# Patient Record
Sex: MALE
[De-identification: names, ages, dates, MRNs, and addresses within clinical notes are randomized per-mention and may not be internally consistent; named-entity substitution may affect disease eponyms.]

---

## 2022-12-01 PROBLEM — Z00.00 ENCOUNTER FOR PREVENTIVE HEALTH EXAMINATION: Status: ACTIVE | Noted: 2022-12-01

## 2022-12-02 ENCOUNTER — APPOINTMENT (OUTPATIENT)
Dept: UROLOGY | Facility: CLINIC | Age: 57
End: 2022-12-02
Payer: COMMERCIAL

## 2022-12-02 VITALS
RESPIRATION RATE: 15 BRPM | OXYGEN SATURATION: 97 % | SYSTOLIC BLOOD PRESSURE: 112 MMHG | TEMPERATURE: 98.3 F | WEIGHT: 127.87 LBS | HEART RATE: 61 BPM | DIASTOLIC BLOOD PRESSURE: 74 MMHG | HEIGHT: 61.42 IN | BODY MASS INDEX: 23.83 KG/M2

## 2022-12-02 DIAGNOSIS — R39.9 UNSPECIFIED SYMPTOMS AND SIGNS INVOLVING THE GENITOURINARY SYSTEM: ICD-10-CM

## 2022-12-02 DIAGNOSIS — Z11.3 ENCOUNTER FOR SCREENING FOR INFECTIONS WITH A PREDOMINANTLY SEXUAL MODE OF TRANSMISSION: ICD-10-CM

## 2022-12-02 PROCEDURE — 51798 US URINE CAPACITY MEASURE: CPT

## 2022-12-02 PROCEDURE — 99204 OFFICE O/P NEW MOD 45 MIN: CPT

## 2022-12-02 NOTE — HISTORY OF PRESENT ILLNESS
[FreeTextEntry1] : Language: Sinhala \par Date of First visit: 12/2/2022\par Accompanied by: friend\par Contact info: \par Referring Provider/PCP: Dr. Emma Tran\par Fax: [phone]\par \par CC/ Problem List: \par \par Urinary symptoms \par STI screening \par \par ===============================================================================\par FIRST VISIT / Summary:\par Very pleasant 58 yo M here for burning with urination, after unprotected sex about 1 month ago. He initially had some lesions on the skin. He describes itchy, with some linear cuts/tears. No pus, no discharge, no vesicles. He saw his primary doctor who gave amoxicillin which he took for 1 week. He said this did not help his symptoms. He also started a cream which has helped some (hydrocortisone). He is taking meloxicam without much help. He is having post-void dribble, still with frequency. IPSS 27 bother of 1 (he marked intermittency and weak stream but in conversation stated has strong stream).\par \par He went to [facility] emergency but said they did not take blood or urine for testing, just gave the hydrocortisone.\par \par -------------------------------------------------------------------------------------------\par INTERVAL VISITS:\par \par ===============================================================================\par \par PMH: none\par Meds: tamsulosin, meloxicam, hydrocortisone ointment\par All: nkda\par FHx: no  malignancy\par SocHx: current smoker, etoh approx , sexually active 1 female partner unprotected\par \par PSH: none\par \par \par ROS: Review of Systems is as per HPI unless otherwise denoted below\par \par \par ===============================================================================\par DATA: \par \par LABS (SELECTED):---------------------------------------------------------------------------------------------------\par 12/2/2022: UA/Cx and STI screening\par \par \par RADS:-------------------------------------------------------------------------------------------------------------------\par \par \par PATHOLOGY/CYTOLOGY:-------------------------------------------------------------------------------------------\par \par \par VOIDING STUDIES: ----------------------------------------------------------------------------------------------------\par 12/2/2022 PVR 35\par \par STONE STUDIES: (Analysis/LLSA)----------------------------------------------------------------------------------\par \par \par \par PROCEDURES: -----------------------------------------------------------------------------------------------\par \par \par \par \par ===============================================================================\par \par PHYSICAL EXAM:\par \par GEN: AAOx3, NAD; \par \par BARRIERS to CARE: none\par \par PSYCH: Appropriate Behavior, Affect Congruent\par \par HEENT: AT/NC Trachea midline. EOMI.\par \par Lungs: No labored breathing.\par \par NEURO: + Movement, all 4 extremities grossly intact without deficits.\par \par SKIN: Warm dry. No visible rashes or ulcers\par \par GAIT: Gait normal, Stability good\par \par ABD: no suprapubic or CVAT\par \par \par  FOCUSED: --------------(done 12/2/2022)------------------------------------------------------------------------\par \par Penis: No lesions, tenderness, curvature, plaques. Circumcised. Minor erythema distal shaft/corona. He states this is where excoriations had been\par \par Meatus: narrow\par \par Testes: Descended bilaterally. Non tender, no palpable masses\par \par Epididymi: No palpable epididymal masses\par \par Scrotum: Scrotal wall normal. No spermatic cord mass. No palpable hydroceles \par \par =======================================================================================\par DISCUSSION: \par The patient was prescribed an alpha blocker today for LUTS. Take by mouth once daily with food: do not cut, chew or crush this tablet  The patient understands that this medication may cause dizziness, retrograde ejaculation or nasal congestion among other complications. I recommended that the patient take this medication at night. If the side effects become too bothersome, the medication can be discontinued. The patient knows to alert his  eye doctor if he  requires cataract surgery after having taken this medication. \par =======================================================================================\par ASSESSMENT and PLAN\par \par \par 1. STI testing\par - HIV/HSV/syphilis as well as G/C\par - UA and cx\par \par 2. LUTS\par - trial of tamsulosin\par - discussed rba he agrees with trial\par - if no improvement with above can trial abx treatment with doxycycline x4 weeks but prefer to avoid prolonged abx without organism to target\par - uroflow next visit\par \par \par =======================================================================================\par \par Thank you for allowing me to assist in the care of your patient. Should you have any questions please do not hesitate to reach out to me.\par \par \par Rodri Turner MD\par [facility]\par [facility] [place]\par [address], [address]\[place] [address]\par T: [phone]\par F: [phone]

## 2022-12-05 LAB
APPEARANCE: CLEAR
BACTERIA UR CULT: NORMAL
BACTERIA: NEGATIVE
BILIRUBIN URINE: NEGATIVE
BLOOD URINE: NEGATIVE
C TRACH RRNA SPEC QL NAA+PROBE: NOT DETECTED
COLOR: YELLOW
GLUCOSE QUALITATIVE U: NEGATIVE
HIV1+2 AB SPEC QL IA.RAPID: NONREACTIVE
HYALINE CASTS: 0 /LPF
KETONES URINE: NEGATIVE
LEUKOCYTE ESTERASE URINE: NEGATIVE
MICROSCOPIC-UA: NORMAL
N GONORRHOEA RRNA SPEC QL NAA+PROBE: NOT DETECTED
NITRITE URINE: NEGATIVE
PH URINE: 5.5
PROTEIN URINE: NEGATIVE
RED BLOOD CELLS URINE: 1 /HPF
SOURCE AMPLIFICATION: NORMAL
SPECIFIC GRAVITY URINE: 1.02
SQUAMOUS EPITHELIAL CELLS: 0 /HPF
T PALLIDUM AB SER QL IA: NEGATIVE
UROBILINOGEN URINE: NORMAL
WHITE BLOOD CELLS URINE: 0 /HPF

## 2022-12-08 LAB
HSV 1 IGG TYPE-SPECIFIC AB: 49.1 INDEX
HSV 2 IGG TYPE-SPECIFIC AB: <0.9 INDEX

## 2023-01-06 ENCOUNTER — APPOINTMENT (OUTPATIENT)
Dept: UROLOGY | Facility: CLINIC | Age: 58
End: 2023-01-06
Payer: COMMERCIAL

## 2023-01-06 VITALS
RESPIRATION RATE: 16 BRPM | WEIGHT: 127 LBS | HEIGHT: 61.42 IN | OXYGEN SATURATION: 98 % | SYSTOLIC BLOOD PRESSURE: 124 MMHG | HEART RATE: 69 BPM | TEMPERATURE: 98.6 F | DIASTOLIC BLOOD PRESSURE: 78 MMHG | BODY MASS INDEX: 23.67 KG/M2

## 2023-01-06 DIAGNOSIS — Z12.5 ENCOUNTER FOR SCREENING FOR MALIGNANT NEOPLASM OF PROSTATE: ICD-10-CM

## 2023-01-06 PROCEDURE — 99214 OFFICE O/P EST MOD 30 MIN: CPT

## 2023-01-06 PROCEDURE — 51741 ELECTRO-UROFLOWMETRY FIRST: CPT

## 2023-01-08 LAB
ANION GAP SERPL CALC-SCNC: 10 MMOL/L
BUN SERPL-MCNC: 22 MG/DL
CALCIUM SERPL-MCNC: 9.4 MG/DL
CHLORIDE SERPL-SCNC: 105 MMOL/L
CO2 SERPL-SCNC: 26 MMOL/L
CREAT SERPL-MCNC: 0.69 MG/DL
EGFR: 109 ML/MIN/1.73M2
GLUCOSE SERPL-MCNC: 101 MG/DL
POTASSIUM SERPL-SCNC: 4.5 MMOL/L
PSA FREE FLD-MCNC: 24 %
PSA FREE SERPL-MCNC: 0.63 NG/ML
PSA SERPL-MCNC: 2.58 NG/ML
SODIUM SERPL-SCNC: 141 MMOL/L

## 2023-01-10 NOTE — HISTORY OF PRESENT ILLNESS
[FreeTextEntry1] : Language: Khmer \par Date of First visit: 12/2/2022\par Accompanied by: friend\par Contact info: \par Referring Provider/PCP: Dr. Emma Tran\par Fax: [phone]\par \par CC/ Problem List: \par \par Urinary symptoms \par STI screening \par \par ===============================================================================\par FIRST VISIT / Summary:\par Very pleasant 56 yo M here for burning with urination, after unprotected sex about 1 month ago. He initially had some lesions on the skin. He describes itchy, with some linear cuts/tears. No pus, no discharge, no vesicles. He saw his primary doctor who gave amoxicillin which he took for 1 week. He said this did not help his symptoms. He also started a cream which has helped some (hydrocortisone). He is taking meloxicam without much help. He is having post-void dribble, still with frequency. IPSS 27 bother of 1 (he marked intermittency and weak stream but in conversation stated has strong stream).\par \par He went to [facility] emergency but said they did not take blood or urine for testing, just gave the hydrocortisone.\par \par -------------------------------------------------------------------------------------------------------------------\par INTERVAL VISITS:\par The patient's medications and allergies were reviewed and edited below. Dated 1/6/23\par \par He states urination is a little better, he has nocturia x2 still but daytime about 4-5x only bothers him at night. He states he is drinking about 1 L of water daily. He has still been using the hydrocortisone occasionally.\par ===============================================================================\par \par PMH: none\par Meds: tamsulosin, meloxicam, hydrocortisone ointment\par All: nkda\par FHx: no  malignancy\par SocHx: current smoker, etoh approx , sexually active 1 female partner unprotected\par \par PSH: none\par \par \par ROS: Review of Systems is as per HPI unless otherwise denoted below\par \par \par ===============================================================================\par DATA: \par \par LABS (SELECTED):---------------------------------------------------------------------------------------------------\par 12/2/2022: UA/Cx and STI screening\par \par \par RADS:-------------------------------------------------------------------------------------------------------------------\par \par \par PATHOLOGY/CYTOLOGY:-------------------------------------------------------------------------------------------\par \par \par VOIDING STUDIES: ----------------------------------------------------------------------------------------------------\par 12/2/2022 PVR 35\par 1/6/2023 Uroflow: low volume 86mL but peak flow 18.4 suggests not obstructed\par \par STONE STUDIES: (Analysis/LLSA)----------------------------------------------------------------------------------\par \par \par \par PROCEDURES: -----------------------------------------------------------------------------------------------\par \par \par \par \par ===============================================================================\par \par PHYSICAL EXAM:\par \par GEN: AAOx3, NAD; \par \par BARRIERS to CARE: none\par \par PSYCH: Appropriate Behavior, Affect Congruent\par \par HEENT: AT/NC Trachea midline. EOMI.\par \par Lungs: No labored breathing.\par \par NEURO: + Movement, all 4 extremities grossly intact without deficits.\par \par SKIN: Warm dry. No visible rashes or ulcers\par \par GAIT: Gait normal, Stability good\par \par ABD: no suprapubic or CVAT\par \par \par  FOCUSED: --------------(done 1/6/23)------------------------------------------------------------------------\par \par Penis: No lesions, tenderness, curvature, plaques. Circumcised. Minor erythema distal shaft/corona. He states this is where excoriations had been (resolved on 1/6/23 exam)\par \par Meatus: narrow\par \par Testes: Descended bilaterally. Non tender, no palpable masses\par \par Epididymi: No palpable epididymal masses\par \par Scrotum: Scrotal wall normal. No spermatic cord mass. No palpable hydroceles \par \par =======================================================================================\par DISCUSSION: \par Today we discussed hydration and continuation of his alpha blocker therapy. He has had some improvement with this. \par =======================================================================================\par ASSESSMENT and PLAN\par \par \par 1. 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 LUTS\par - continue tamsulosin has had some benefit\par - uroflow today low volume 86mL but peak flow 18.4 suggests not obstructed\par \par 3. PSA screening\par - did not receive PSA results or other labs from PMD\par - will send today\par - he wished to check kidney function as well\par - f/u in 3 months\par \par \par =======================================================================================\par \par Thank you for allowing me to assist in the care of your patient. Should you have any questions please do not hesitate to reach out to me.\par \par \par Rodri Turner MD\par [facility]\par [facility] at [place]\par [address], [address]\[place] [address]\par T: [phone]\par F: [phone] \par

## 2023-04-07 ENCOUNTER — APPOINTMENT (OUTPATIENT)
Dept: UROLOGY | Facility: CLINIC | Age: 58
End: 2023-04-07
Payer: COMMERCIAL

## 2023-04-07 ENCOUNTER — APPOINTMENT (OUTPATIENT)
Dept: UROLOGY | Facility: CLINIC | Age: 58
End: 2023-04-07

## 2023-04-21 ENCOUNTER — APPOINTMENT (OUTPATIENT)
Dept: UROLOGY | Facility: CLINIC | Age: 58
End: 2023-04-21
Payer: COMMERCIAL

## 2023-04-21 VITALS
DIASTOLIC BLOOD PRESSURE: 71 MMHG | WEIGHT: 127 LBS | TEMPERATURE: 98.3 F | HEIGHT: 61.42 IN | RESPIRATION RATE: 16 BRPM | OXYGEN SATURATION: 94 % | BODY MASS INDEX: 23.67 KG/M2 | HEART RATE: 66 BPM | SYSTOLIC BLOOD PRESSURE: 112 MMHG

## 2023-04-21 PROCEDURE — 99213 OFFICE O/P EST LOW 20 MIN: CPT

## 2023-04-21 NOTE — HISTORY OF PRESENT ILLNESS
[FreeTextEntry1] : Language: Syriac \par Date of First visit: 12/2/2022\par Accompanied by: friend\par Contact info: \par Referring Provider/PCP: Dr. Emma Tran\par Fax: [phone]\par \par CC/ Problem List: \par \par Urinary symptoms \par Dysuria\par \par ===============================================================================\par FIRST VISIT / Summary:\par Very pleasant 58 yo M here for burning with urination, after unprotected sex about 1 month ago. He initially had some lesions on the skin. He describes itchy, with some linear cuts/tears. No pus, no discharge, no vesicles. He saw his primary doctor who gave amoxicillin which he took for 1 week. He said this did not help his symptoms. He also started a cream which has helped some (hydrocortisone). He is taking meloxicam without much help. He is having post-void dribble, still with frequency. IPSS 27 bother of 1 (he marked intermittency and weak stream but in conversation stated has strong stream).\par \par He went to [facility] emergency but said they did not take blood or urine for testing, just gave the hydrocortisone.\par \par -------------------------------------------------------------------------------------------------------------------\par INTERVAL VISITS:\par The patient's medications and allergies were reviewed and edited below. Dated 1/6/23\par \par He states urination is a little better, he is not taking tamsulosin all the time, but is not bothered much by this.  He has the penile burning/pain again, not while urinating all the time.\par ===============================================================================\par \par PMH: none\par Meds: tamsulosin, meloxicam, hydrocortisone ointment\par All: nkda\par FHx: no  malignancy\par SocHx: current smoker, etoh approx , sexually active 1 female partner unprotected\par \par PSH: none\par \par \par ROS: Review of Systems is as per HPI unless otherwise denoted below\par \par \par ===============================================================================\par DATA: \par \par LABS (SELECTED):---------------------------------------------------------------------------------------------------\par 12/2/2022: UA/Cx and STI screening\par 01/08/2023: Cr 0.69, PSA 2.58 with 0.63 free (24%)\par \par \par RADS:-------------------------------------------------------------------------------------------------------------------\par \par \par PATHOLOGY/CYTOLOGY:-------------------------------------------------------------------------------------------\par \par \par VOIDING STUDIES: ----------------------------------------------------------------------------------------------------\par 12/2/2022 PVR 35\par 1/6/2023 Uroflow: low volume 86mL but peak flow 18.4 suggests not obstructed\par \par \par STONE STUDIES: (Analysis/LLSA)----------------------------------------------------------------------------------\par \par \par \par PROCEDURES: -----------------------------------------------------------------------------------------------\par \par \par \par ===============================================================================\par \par PHYSICAL EXAM:\par \par GEN: AAOx3, NAD; \par \par BARRIERS to CARE: none\par \par PSYCH: Appropriate Behavior, Affect Congruent\par \par HEENT: AT/NC Trachea midline. EOMI.\par \par Lungs: No labored breathing.\par \par NEURO: + Movement, all 4 extremities grossly intact without deficits.\par \par SKIN: Warm dry. No visible rashes or ulcers\par \par GAIT: Gait normal, Stability good\par \par ABD: no suprapubic or CVAT\par \par \par  FOCUSED: --------------(done 1/6/23)------------------------------------------------------------------------\par \par Penis: No tenderness, curvature, plaques. Circumcised. Minor erythema distal shaft/corona.\par \par Meatus: normal caliber\par \par Testes: Descended bilaterally. Non tender, no palpable masses\par \par Epididymi: No palpable epididymal masses\par \par Scrotum: Scrotal wall normal. No spermatic cord mass. No palpable hydroceles\par \par Chaperone: offered and patient declined \par \par =======================================================================================\par DISCUSSION: \par Today we discussed hydration and continuation of his alpha blocker therapy. He has had some improvement with this. \par =======================================================================================\par ASSESSMENT and PLAN\par \par 1. STI testing\par - HIV/syphilis as well as G/C negative. UA and cx negative\par - HSV-1 positive\par \par 2. LUTS\par - continue tamsulosin PRN has had some benefit\par \par 3. PSA screening\par - normal, recheck annually\par \par 4. dry penile skin\par - bacitracin or vaseline for 1-2 weeks until resolved, then as needed for prevention\par - appears to by dry / cracked skin\par \par =======================================================================================\par \par Thank you for allowing me to assist in the care of your patient. Should you have any questions please do not hesitate to reach out to me.\par \par \par Rodri Turner MD\par [facility]\par [facility] for [facility] at [place]\par [address], [address]\[place] [address]\par T: [phone]\par F: [phone]

## 2023-08-17 ENCOUNTER — RX RENEWAL (OUTPATIENT)
Age: 58
End: 2023-08-17

## 2024-05-20 ENCOUNTER — RX RENEWAL (OUTPATIENT)
Age: 59
End: 2024-05-20

## 2024-05-20 RX ORDER — TAMSULOSIN HYDROCHLORIDE 0.4 MG/1
0.4 CAPSULE ORAL
Qty: 90 | Refills: 3 | Status: ACTIVE | COMMUNITY
Start: 2022-12-02 | End: 1900-01-01